# Patient Record
Sex: FEMALE | Race: WHITE
[De-identification: names, ages, dates, MRNs, and addresses within clinical notes are randomized per-mention and may not be internally consistent; named-entity substitution may affect disease eponyms.]

---

## 2017-10-10 ENCOUNTER — HOSPITAL ENCOUNTER (EMERGENCY)
Dept: HOSPITAL 74 - FER | Age: 62
Discharge: HOME | End: 2017-10-10
Payer: COMMERCIAL

## 2017-10-10 VITALS — SYSTOLIC BLOOD PRESSURE: 115 MMHG | HEART RATE: 90 BPM | TEMPERATURE: 98.9 F | DIASTOLIC BLOOD PRESSURE: 84 MMHG

## 2017-10-10 VITALS — BODY MASS INDEX: 36.6 KG/M2

## 2017-10-10 DIAGNOSIS — E11.9: ICD-10-CM

## 2017-10-10 DIAGNOSIS — J34.89: Primary | ICD-10-CM

## 2017-10-10 NOTE — PDOC
History of Present Illness





- General


History Source: Patient (Slight pain due to pimple in her nose and on her side. 

)


Exam Limitations: No Limitations





<Aryan Walton - Last Filed: 10/10/17 14:25>





- General


History Source: Patient





<Roberto Sales - Last Filed: 10/10/17 14:53>





- General


Chief Complaint: Abscess Boil


Stated Complaint: NOSE PAIN


Time Seen by Provider: 10/10/17 13:53





Past History





- Past Medical History


Diabetes: Yes (Insulin dependent)





<Aryan Walton - Last Filed: 10/10/17 14:25>





- Past Medical History


Anemia: No


Asthma: No


Cancer: No


Cardiac Disorders: No


CVA: No


COPD: No


CHF: No


Dementia: No


Diabetes: Yes


GI Disorders: No


 Disorders: No


HTN: No


Hypercholesterolemia: No


Liver Disease: No


Seizures: No


Thyroid Disease: No





- Surgical History


Abdominal Surgery: No


Appendectomy: No


Cardiac Surgery: No


Cholecystectomy: No


Lung Surgery: No


Neurologic Surgery: No


Orthopedic Surgery: No





- Suicide/Smoking/Psychosocial Hx


Smoking Status: No


Smoking History: Never smoked


Have you smoked in the past 12 months: No


Number of Cigarettes Smoked Daily: 0


Hx Alcohol Use: No


Drug/Substance Use Hx: No


Substance Use Type: None


Hx Substance Use Treatment: No





<MónicaRoberto S - Last Filed: 10/10/17 14:53>





- Past Medical History


Allergies/Adverse Reactions: 


 Allergies











Allergy/AdvReac Type Severity Reaction Status Date / Time


 


No Known Allergies Allergy   Verified 03/11/13 11:14











Home Medications: 


Ambulatory Orders





Nitrofurantoin Monohyd/M-Cryst [Macrobid] 100 mg PO BID #6 capsule 03/11/13 


Paroxetine HCl [Paxil]  mg PO HS 03/11/13 


Cephalexin Monohydrate [Keflex -] 500 mg PO Q8H #20 capsule 10/10/17 


Sulfamethoxazole/Trimethoprim [Bactrim Ds Tablet] 1 each PO BID #10 tablet 10/10

/17 











**Review of Systems





- Review of Systems


Able to Perform ROS?: Yes


HEENTM: Yes: Nose Pain


Musculoskeletal: Yes: Other (Right flank pain. )


All Other Systems: Reviewed and Negative





<Aryan Walton - Last Filed: 10/10/17 14:25>





*Physical Exam





- Vital Signs


 Last Vital Signs











Temp Pulse Resp BP Pulse Ox


 


 98.9 F   90   15   115/84   98 


 


 10/10/17 13:50  10/10/17 13:50  10/10/17 13:50  10/10/17 13:50  10/10/17 13:50














- Physical Exam


HEENT: positive: Other (Small Abscess in right nasal septum. )


Musculoskeletal: positive: Other (Small abscess right flank)





<Aryan Walton - Last Filed: 10/10/17 14:25>





- Vital Signs


 Last Vital Signs











Temp Pulse Resp BP Pulse Ox


 


 98.9 F   90   15   115/84   98 


 


 10/10/17 13:50  10/10/17 13:50  10/10/17 13:50  10/10/17 13:50  10/10/17 13:50














<Roberto Sales - Last Filed: 10/10/17 14:53>





*DC/Admit/Observation/Transfer





- Attestations


Scribe Attestion: 





10/10/17 14:28





Documentation prepared by Aryan Walton, acting as medical scribe for Roberto Sales MD/DO.





<Aryan Walton - Last Filed: 10/10/17 14:25>





- Discharge Dispostion


Admit: No





<Roberto Sales - Last Filed: 10/10/17 14:53>


Diagnosis at time of Disposition: 


 Abscess of nose (septum)





- Discharge Dispostion


Disposition: HOME


Condition at time of disposition: Stable





- Referrals


Referrals: 


Donta Moore MD [Staff Physician] - 





- Patient Instructions


Printed Discharge Instructions:  DI for Skin Abscess


Additional Instructions: 


Take daily antibiotics as prescribed, call today  the ENT doctor for 

appointment .


If pain , headache return to ER any time

## 2021-04-10 ENCOUNTER — HOSPITAL ENCOUNTER (EMERGENCY)
Dept: HOSPITAL 74 - JER | Age: 66
Discharge: HOME | End: 2021-04-10
Payer: COMMERCIAL

## 2021-04-10 VITALS — TEMPERATURE: 97.6 F | SYSTOLIC BLOOD PRESSURE: 96 MMHG | DIASTOLIC BLOOD PRESSURE: 72 MMHG | HEART RATE: 83 BPM

## 2021-04-10 VITALS — BODY MASS INDEX: 31.1 KG/M2

## 2021-04-10 DIAGNOSIS — F41.9: Primary | ICD-10-CM

## 2021-04-10 LAB
ALBUMIN SERPL-MCNC: 3.6 G/DL (ref 3.4–5)
ALP SERPL-CCNC: 161 U/L (ref 45–117)
ALT SERPL-CCNC: 26 U/L (ref 13–61)
ANION GAP SERPL CALC-SCNC: 8 MMOL/L (ref 8–16)
AST SERPL-CCNC: 12 U/L (ref 15–37)
BASOPHILS # BLD: 0.7 % (ref 0–2)
BILIRUB SERPL-MCNC: 0.3 MG/DL (ref 0.2–1)
BUN SERPL-MCNC: 19.9 MG/DL (ref 7–18)
CALCIUM SERPL-MCNC: 9.5 MG/DL (ref 8.5–10.1)
CHLORIDE SERPL-SCNC: 96 MMOL/L (ref 98–107)
CO2 SERPL-SCNC: 26 MMOL/L (ref 21–32)
CREAT SERPL-MCNC: 1 MG/DL (ref 0.55–1.3)
DEPRECATED RDW RBC AUTO: 14.1 % (ref 11.6–15.6)
EOSINOPHIL # BLD: 0.7 % (ref 0–4.5)
GLUCOSE SERPL-MCNC: 473 MG/DL (ref 74–106)
HCT VFR BLD CALC: 43.4 % (ref 32.4–45.2)
HGB BLD-MCNC: 14.7 GM/DL (ref 10.7–15.3)
LYMPHOCYTES # BLD: 21.2 % (ref 8–40)
MCH RBC QN AUTO: 29.2 PG (ref 25.7–33.7)
MCHC RBC AUTO-ENTMCNC: 33.8 G/DL (ref 32–36)
MCV RBC: 86.4 FL (ref 80–96)
MONOCYTES # BLD AUTO: 6.1 % (ref 3.8–10.2)
NEUTROPHILS # BLD: 71.3 % (ref 42.8–82.8)
PLATELET # BLD AUTO: 257 K/MM3 (ref 134–434)
PMV BLD: 10.2 FL (ref 7.5–11.1)
PROT SERPL-MCNC: 7.4 G/DL (ref 6.4–8.2)
RBC # BLD AUTO: 5.02 M/MM3 (ref 3.6–5.2)
SODIUM SERPL-SCNC: 130 MMOL/L (ref 136–145)
WBC # BLD AUTO: 11.6 K/MM3 (ref 4–10)

## 2023-01-19 ENCOUNTER — HOSPITAL ENCOUNTER (OUTPATIENT)
Dept: HOSPITAL 74 - FMAMMOTONE | Age: 68
Discharge: HOME | End: 2023-01-19
Attending: MIDWIFE
Payer: COMMERCIAL

## 2023-01-19 DIAGNOSIS — N60.11: Primary | ICD-10-CM

## 2023-01-19 DIAGNOSIS — N60.91: ICD-10-CM

## 2023-01-19 DIAGNOSIS — N64.89: ICD-10-CM

## 2023-01-19 DIAGNOSIS — R92.0: ICD-10-CM

## 2023-01-19 PROCEDURE — 0HBT3ZX EXCISION OF RIGHT BREAST, PERCUTANEOUS APPROACH, DIAGNOSTIC: ICD-10-PCS

## 2023-01-19 PROCEDURE — A4648 IMPLANTABLE TISSUE MARKER: HCPCS

## 2023-03-20 ENCOUNTER — HOSPITAL ENCOUNTER (OUTPATIENT)
Dept: HOSPITAL 74 - JRADUS-SUR | Age: 68
Discharge: HOME | End: 2023-03-20
Attending: SURGERY
Payer: COMMERCIAL

## 2023-03-20 DIAGNOSIS — N60.91: Primary | ICD-10-CM

## 2023-03-20 PROCEDURE — 19281 PERQ DEVICE BREAST 1ST IMAG: CPT

## 2023-03-20 PROCEDURE — A4648 IMPLANTABLE TISSUE MARKER: HCPCS

## 2023-03-20 PROCEDURE — BH00ZZZ PLAIN RADIOGRAPHY OF RIGHT BREAST: ICD-10-PCS | Performed by: SURGERY

## 2023-03-22 ENCOUNTER — HOSPITAL ENCOUNTER (OUTPATIENT)
Dept: HOSPITAL 74 - JASU-SURG | Age: 68
Discharge: HOME | End: 2023-03-22
Attending: SURGERY
Payer: COMMERCIAL

## 2023-03-22 VITALS — HEART RATE: 79 BPM | DIASTOLIC BLOOD PRESSURE: 62 MMHG | TEMPERATURE: 98.7 F | SYSTOLIC BLOOD PRESSURE: 130 MMHG

## 2023-03-22 VITALS — RESPIRATION RATE: 18 BRPM

## 2023-03-22 VITALS — BODY MASS INDEX: 38.5 KG/M2

## 2023-03-22 DIAGNOSIS — N60.11: Primary | ICD-10-CM

## 2023-03-22 PROCEDURE — 0HBT0ZX EXCISION OF RIGHT BREAST, OPEN APPROACH, DIAGNOSTIC: ICD-10-PCS | Performed by: SURGERY

## 2023-04-03 ENCOUNTER — OFFICE (OUTPATIENT)
Dept: URBAN - METROPOLITAN AREA CLINIC 30 | Facility: CLINIC | Age: 68
Setting detail: OPHTHALMOLOGY
End: 2023-04-03
Payer: COMMERCIAL

## 2023-04-03 DIAGNOSIS — H47.233: ICD-10-CM

## 2023-04-03 DIAGNOSIS — H52.4: ICD-10-CM

## 2023-04-03 DIAGNOSIS — H11.001: ICD-10-CM

## 2023-04-03 DIAGNOSIS — H40.003: ICD-10-CM

## 2023-04-03 DIAGNOSIS — E11.3293: ICD-10-CM

## 2023-04-03 PROCEDURE — 92004 COMPRE OPH EXAM NEW PT 1/>: CPT | Performed by: OPHTHALMOLOGY

## 2023-04-03 PROCEDURE — 92202 OPSCPY EXTND ON/MAC DRAW: CPT | Performed by: OPHTHALMOLOGY

## 2023-04-03 PROCEDURE — 92020 GONIOSCOPY: CPT | Performed by: OPHTHALMOLOGY

## 2023-04-03 PROCEDURE — 92015 DETERMINE REFRACTIVE STATE: CPT | Performed by: OPHTHALMOLOGY

## 2023-04-03 PROCEDURE — 92250 FUNDUS PHOTOGRAPHY W/I&R: CPT | Performed by: OPHTHALMOLOGY

## 2023-04-03 ASSESSMENT — TONOMETRY
OD_IOP_MMHG: 18
OS_IOP_MMHG: 18

## 2023-04-03 ASSESSMENT — VISUAL ACUITY
OD_BCVA: 20/25-2
OS_BCVA: 20/40-1

## 2023-04-03 ASSESSMENT — REFRACTION_CURRENTRX
OD_OVR_VA: 20/
OD_CYLINDER: SPH
OS_ADD: +2.25
OD_AXIS: 11
OS_CYLINDER: SPH
OS_AXIS: 90
OS_SPHERE: +2.50
OS_OVR_VA: 20/
OD_SPHERE: +2.50
OD_ADD: +2.25

## 2023-04-03 ASSESSMENT — REFRACTION_AUTOREFRACTION
OD_AXIS: 175
OD_SPHERE: +1.75
OD_CYLINDER: +0.50
OS_AXIS: 176
OS_CYLINDER: +0.25
OS_SPHERE: +1.50

## 2023-04-03 ASSESSMENT — REFRACTION_MANIFEST
OD_ADD: +2.25
OS_CYLINDER: SPH
OS_SPHERE: +2.50
OS_ADD: +2.25
OD_AXIS: 11
OD_SPHERE: +2.50
OS_AXIS: 90
OD_CYLINDER: SPH

## 2023-04-03 ASSESSMENT — SPHEQUIV_DERIVED
OS_SPHEQUIV: 1.625
OD_SPHEQUIV: 2

## 2023-04-03 ASSESSMENT — CONFRONTATIONAL VISUAL FIELD TEST (CVF)
OD_FINDINGS: FULL
OS_FINDINGS: FULL

## 2023-10-03 ENCOUNTER — OFFICE (OUTPATIENT)
Dept: URBAN - METROPOLITAN AREA CLINIC 30 | Facility: CLINIC | Age: 68
Setting detail: OPHTHALMOLOGY
End: 2023-10-03
Payer: COMMERCIAL

## 2023-10-03 DIAGNOSIS — H11.001: ICD-10-CM

## 2023-10-03 DIAGNOSIS — H47.233: ICD-10-CM

## 2023-10-03 DIAGNOSIS — H25.093: ICD-10-CM

## 2023-10-03 DIAGNOSIS — H40.003: ICD-10-CM

## 2023-10-03 DIAGNOSIS — E11.3293: ICD-10-CM

## 2023-10-03 PROCEDURE — 92083 EXTENDED VISUAL FIELD XM: CPT | Performed by: OPHTHALMOLOGY

## 2023-10-03 PROCEDURE — 76514 ECHO EXAM OF EYE THICKNESS: CPT | Performed by: OPHTHALMOLOGY

## 2023-10-03 PROCEDURE — 92014 COMPRE OPH EXAM EST PT 1/>: CPT | Performed by: OPHTHALMOLOGY

## 2023-10-03 PROCEDURE — 92133 CPTRZD OPH DX IMG PST SGM ON: CPT | Performed by: OPHTHALMOLOGY

## 2023-10-03 ASSESSMENT — REFRACTION_MANIFEST
OS_ADD: +2.25
OD_CYLINDER: SPH
OS_SPHERE: +2.50
OD_ADD: +2.25
OS_AXIS: 90
OD_SPHERE: +2.50
OD_AXIS: 11
OS_CYLINDER: SPH

## 2023-10-03 ASSESSMENT — TONOMETRY
OS_IOP_MMHG: 20
OD_IOP_MMHG: 19

## 2023-10-03 ASSESSMENT — REFRACTION_CURRENTRX
OS_ADD: +2.25
OS_SPHERE: +2.50
OD_CYLINDER: SPH
OD_ADD: +2.25
OD_OVR_VA: 20/
OS_CYLINDER: SPH
OS_AXIS: 90
OD_AXIS: 11
OS_OVR_VA: 20/
OD_SPHERE: +2.50

## 2023-10-03 ASSESSMENT — VISUAL ACUITY
OS_BCVA: 20/30
OD_BCVA: 20/25

## 2023-10-03 ASSESSMENT — SPHEQUIV_DERIVED
OD_SPHEQUIV: 2
OS_SPHEQUIV: 1.625

## 2023-10-03 ASSESSMENT — PACHYMETRY
OS_CT_CORRECTION: 0
OS_CT_UM: 544
OD_CT_CORRECTION: 0
OD_CT_UM: 544

## 2023-10-03 ASSESSMENT — REFRACTION_AUTOREFRACTION
OS_AXIS: 176
OD_CYLINDER: +0.50
OD_AXIS: 175
OS_CYLINDER: +0.25
OS_SPHERE: +1.50
OD_SPHERE: +1.75

## 2023-10-03 ASSESSMENT — CONFRONTATIONAL VISUAL FIELD TEST (CVF)
OD_FINDINGS: FULL
OS_FINDINGS: FULL

## 2025-07-22 ENCOUNTER — OFFICE (OUTPATIENT)
Facility: LOCATION | Age: 70
Setting detail: OPHTHALMOLOGY
End: 2025-07-22
Payer: COMMERCIAL

## 2025-07-22 DIAGNOSIS — H40.003: ICD-10-CM

## 2025-07-22 DIAGNOSIS — H47.233: ICD-10-CM

## 2025-07-22 DIAGNOSIS — H25.13: ICD-10-CM

## 2025-07-22 DIAGNOSIS — E11.3293: ICD-10-CM

## 2025-07-22 DIAGNOSIS — H52.4: ICD-10-CM

## 2025-07-22 PROCEDURE — 92083 EXTENDED VISUAL FIELD XM: CPT | Performed by: OPHTHALMOLOGY

## 2025-07-22 PROCEDURE — 92014 COMPRE OPH EXAM EST PT 1/>: CPT | Performed by: OPHTHALMOLOGY

## 2025-07-22 PROCEDURE — 92015 DETERMINE REFRACTIVE STATE: CPT | Performed by: OPHTHALMOLOGY

## 2025-07-22 PROCEDURE — 92020 GONIOSCOPY: CPT | Performed by: OPHTHALMOLOGY

## 2025-07-22 PROCEDURE — 92250 FUNDUS PHOTOGRAPHY W/I&R: CPT | Performed by: OPHTHALMOLOGY

## 2025-07-22 ASSESSMENT — REFRACTION_MANIFEST
OS_AXIS: 90
OD_SPHERE: +2.50
OS_SPHERE: +2.50
OD_CYLINDER: SPH
OS_ADD: +2.25
OD_ADD: +2.25
OD_AXIS: 11
OS_CYLINDER: SPH

## 2025-07-22 ASSESSMENT — REFRACTION_CURRENTRX
OS_CYLINDER: SPH
OS_ADD: +2.25
OD_OVR_VA: 20/
OS_SPHERE: +2.75
OD_SPHERE: +2.50
OS_AXIS: 90
OD_AXIS: 018
OD_CYLINDER: +0.25
OD_ADD: +2.25
OS_OVR_VA: 20/

## 2025-07-22 ASSESSMENT — TONOMETRY
OD_IOP_MMHG: 18
OS_IOP_MMHG: 18

## 2025-07-22 ASSESSMENT — CONFRONTATIONAL VISUAL FIELD TEST (CVF)
OD_FINDINGS: FULL
OS_FINDINGS: FULL

## 2025-07-22 ASSESSMENT — REFRACTION_AUTOREFRACTION
OD_AXIS: 158
OD_CYLINDER: +1.00
OS_CYLINDER: +0.25
OS_AXIS: 119
OS_SPHERE: +1.75
OD_SPHERE: +1.25

## 2025-07-22 ASSESSMENT — PACHYMETRY
OS_CT_CORRECTION: 0
OD_CT_CORRECTION: 0
OS_CT_UM: 544
OD_CT_UM: 544

## 2025-07-22 ASSESSMENT — VISUAL ACUITY
OS_BCVA: 20/40
OD_BCVA: 20/25-2